# Patient Record
Sex: FEMALE | Race: WHITE
[De-identification: names, ages, dates, MRNs, and addresses within clinical notes are randomized per-mention and may not be internally consistent; named-entity substitution may affect disease eponyms.]

---

## 2018-08-21 ENCOUNTER — HOSPITAL ENCOUNTER (OUTPATIENT)
Dept: HOSPITAL 53 - M SMT | Age: 28
End: 2018-08-21
Attending: ADVANCED PRACTICE MIDWIFE
Payer: COMMERCIAL

## 2018-08-21 DIAGNOSIS — Z3A.10: Primary | ICD-10-CM

## 2018-08-21 DIAGNOSIS — Z34.81: ICD-10-CM

## 2018-08-21 LAB
BASO #: 0 10^3/UL (ref 0–0.2)
BASO %: 0.3 % (ref 0–1)
CHLAMYDIA DNA AMPLIFICATION: NEGATIVE
EOS #: 0.1 10^3/UL (ref 0–0.5)
EOSINOPHIL NFR BLD AUTO: 1.4 % (ref 0–3)
GC DNA AMPLIFICATION: NEGATIVE
HEMATOCRIT: 36.1 % (ref 36–47)
HEMOGLOBIN: 12.3 G/DL (ref 12–15.5)
IMMATURE GRANULOCYTE %: 0.3 % (ref 0–3)
LYMPH #: 1.8 10^3/UL (ref 1.5–6.5)
LYMPH %: 24.8 % (ref 24–44)
MEAN CORPUSCULAR HEMOGLOBIN: 31.8 PG (ref 27–33)
MEAN CORPUSCULAR HGB CONC: 34.1 G/DL (ref 32–36.5)
MEAN CORPUSCULAR VOLUME: 93.3 FL (ref 80–96)
MONO #: 0.7 10^3/UL (ref 0–0.8)
MONO %: 9.1 % (ref 0–5)
NEUTROPHILS #: 4.6 10^3/UL (ref 1.8–7.7)
NEUTROPHILS %: 64.1 % (ref 36–66)
NRBC BLD AUTO-RTO: 0 % (ref 0–0)
PLATELET COUNT, AUTOMATED: 229 10^3/UL (ref 150–450)
RED BLOOD COUNT: 3.87 10^6/UL (ref 4–5.4)
RED CELL DISTRIBUTION WIDTH: 11.9 % (ref 11.5–14.5)
WHITE BLOOD COUNT: 7.1 10^3/UL (ref 4–10)

## 2018-08-22 LAB
HBSAG PRENATAL: NEGATIVE
HCV AB SER QL: 0.1 INDEX (ref ?–0.8)
HIV 1&2 SCREEN CENTAUR: NEGATIVE
RUBELLA IGG QUALITATIVE: (no result)
SYPHILIS: NONREACTIVE

## 2018-09-18 ENCOUNTER — HOSPITAL ENCOUNTER (OUTPATIENT)
Dept: HOSPITAL 53 - M SMT | Age: 28
End: 2018-09-18
Attending: ADVANCED PRACTICE MIDWIFE
Payer: COMMERCIAL

## 2018-09-18 DIAGNOSIS — Z36.89: Primary | ICD-10-CM

## 2018-09-18 DIAGNOSIS — Z3A.00: ICD-10-CM

## 2018-09-18 PROCEDURE — 36415 COLL VENOUS BLD VENIPUNCTURE: CPT

## 2018-10-17 ENCOUNTER — HOSPITAL ENCOUNTER (OUTPATIENT)
Dept: HOSPITAL 53 - M SMT | Age: 28
End: 2018-10-17
Attending: ADVANCED PRACTICE MIDWIFE
Payer: COMMERCIAL

## 2018-10-17 DIAGNOSIS — Z3A.18: ICD-10-CM

## 2018-10-17 DIAGNOSIS — Z36.89: Primary | ICD-10-CM

## 2018-10-17 PROCEDURE — 76811 OB US DETAILED SNGL FETUS: CPT

## 2018-11-14 ENCOUNTER — HOSPITAL ENCOUNTER (OUTPATIENT)
Dept: HOSPITAL 53 - M SMT | Age: 28
End: 2018-11-14
Attending: ADVANCED PRACTICE MIDWIFE
Payer: COMMERCIAL

## 2018-11-14 DIAGNOSIS — Z36.89: ICD-10-CM

## 2018-11-14 DIAGNOSIS — Z34.02: Primary | ICD-10-CM

## 2018-11-14 DIAGNOSIS — Z3A.21: ICD-10-CM

## 2018-11-14 PROCEDURE — 76816 OB US FOLLOW-UP PER FETUS: CPT

## 2018-12-12 ENCOUNTER — HOSPITAL ENCOUNTER (OUTPATIENT)
Dept: HOSPITAL 53 - M SMT | Age: 28
End: 2018-12-12
Payer: COMMERCIAL

## 2018-12-12 DIAGNOSIS — Z36.89: Primary | ICD-10-CM

## 2018-12-12 LAB
BASOPHILS # BLD AUTO: 0 10^3/UL (ref 0–0.2)
BASOPHILS NFR BLD AUTO: 0.2 % (ref 0–1)
EOSINOPHIL # BLD AUTO: 0.1 10^3/UL (ref 0–0.5)
EOSINOPHIL NFR BLD AUTO: 0.6 % (ref 0–3)
HCT VFR BLD AUTO: 37.6 % (ref 36–47)
HGB BLD-MCNC: 12.5 G/DL (ref 12–15.5)
LYMPHOCYTES # BLD AUTO: 2 10^3/UL (ref 1.5–4.5)
LYMPHOCYTES NFR BLD AUTO: 20.4 % (ref 24–44)
MCH RBC QN AUTO: 31.4 PG (ref 27–33)
MCHC RBC AUTO-ENTMCNC: 33.2 G/DL (ref 32–36.5)
MCV RBC AUTO: 94.5 FL (ref 80–96)
MONOCYTES # BLD AUTO: 0.9 10^3/UL (ref 0–0.8)
MONOCYTES NFR BLD AUTO: 8.9 % (ref 0–5)
NEUTROPHILS # BLD AUTO: 6.8 10^3/UL (ref 1.8–7.7)
NEUTROPHILS NFR BLD AUTO: 69.3 % (ref 36–66)
PLATELET # BLD AUTO: 228 10^3/UL (ref 150–450)
RBC # BLD AUTO: 3.98 10^6/UL (ref 4–5.4)
WBC # BLD AUTO: 9.8 10^3/UL (ref 4–10)

## 2018-12-12 PROCEDURE — 86850 RBC ANTIBODY SCREEN: CPT

## 2018-12-12 PROCEDURE — 82950 GLUCOSE TEST: CPT

## 2018-12-12 PROCEDURE — 85025 COMPLETE CBC W/AUTO DIFF WBC: CPT

## 2018-12-12 PROCEDURE — 86900 BLOOD TYPING SEROLOGIC ABO: CPT

## 2018-12-12 PROCEDURE — 86901 BLOOD TYPING SEROLOGIC RH(D): CPT

## 2018-12-12 PROCEDURE — 36415 COLL VENOUS BLD VENIPUNCTURE: CPT

## 2019-01-16 ENCOUNTER — HOSPITAL ENCOUNTER (OUTPATIENT)
Dept: HOSPITAL 53 - M LAB REF | Age: 29
End: 2019-01-16
Attending: ADVANCED PRACTICE MIDWIFE
Payer: COMMERCIAL

## 2019-01-16 DIAGNOSIS — Z36.89: ICD-10-CM

## 2019-01-16 DIAGNOSIS — Z34.83: Primary | ICD-10-CM

## 2019-02-20 ENCOUNTER — HOSPITAL ENCOUNTER (OUTPATIENT)
Dept: HOSPITAL 53 - M LAB REF | Age: 29
End: 2019-02-20
Attending: OBSTETRICS & GYNECOLOGY
Payer: COMMERCIAL

## 2019-02-20 DIAGNOSIS — Z3A.00: ICD-10-CM

## 2019-02-20 DIAGNOSIS — Z34.83: Primary | ICD-10-CM

## 2019-03-08 ENCOUNTER — HOSPITAL ENCOUNTER (OUTPATIENT)
Dept: HOSPITAL 53 - M LAB | Age: 29
End: 2019-03-08
Attending: ADVANCED PRACTICE MIDWIFE
Payer: COMMERCIAL

## 2019-03-08 DIAGNOSIS — O26.893: Primary | ICD-10-CM

## 2019-03-08 LAB
ALBUMIN SERPL BCG-MCNC: 2.6 GM/DL (ref 3.2–5.2)
ALT SERPL W P-5'-P-CCNC: 58 U/L (ref 12–78)
BILIRUB CONJ SERPL-MCNC: 0.2 MG/DL (ref 0–0.2)
BILIRUB SERPL-MCNC: 0.3 MG/DL (ref 0.2–1)
HCT VFR BLD AUTO: 36.4 % (ref 36–47)
HGB BLD-MCNC: 12.5 G/DL (ref 12–15.5)
MCH RBC QN AUTO: 31.6 PG (ref 27–33)
MCHC RBC AUTO-ENTMCNC: 34.3 G/DL (ref 32–36.5)
MCV RBC AUTO: 91.9 FL (ref 80–96)
PLATELET # BLD AUTO: 221 10^3/UL (ref 150–450)
PROT SERPL-MCNC: 5.8 GM/DL (ref 6.4–8.2)
RBC # BLD AUTO: 3.96 10^6/UL (ref 4–5.4)
WBC # BLD AUTO: 8.9 10^3/UL (ref 4–10)

## 2019-03-11 ENCOUNTER — HOSPITAL ENCOUNTER (INPATIENT)
Dept: HOSPITAL 53 - M LDI | Age: 29
LOS: 3 days | Discharge: HOME | End: 2019-03-14
Attending: ADVANCED PRACTICE MIDWIFE | Admitting: ADVANCED PRACTICE MIDWIFE
Payer: COMMERCIAL

## 2019-03-11 VITALS — DIASTOLIC BLOOD PRESSURE: 84 MMHG | SYSTOLIC BLOOD PRESSURE: 120 MMHG

## 2019-03-11 VITALS — SYSTOLIC BLOOD PRESSURE: 114 MMHG | DIASTOLIC BLOOD PRESSURE: 72 MMHG

## 2019-03-11 VITALS — DIASTOLIC BLOOD PRESSURE: 87 MMHG | SYSTOLIC BLOOD PRESSURE: 130 MMHG

## 2019-03-11 VITALS — DIASTOLIC BLOOD PRESSURE: 79 MMHG | SYSTOLIC BLOOD PRESSURE: 119 MMHG

## 2019-03-11 VITALS — HEIGHT: 65 IN | BODY MASS INDEX: 24.98 KG/M2 | WEIGHT: 149.91 LBS

## 2019-03-11 VITALS — DIASTOLIC BLOOD PRESSURE: 82 MMHG | SYSTOLIC BLOOD PRESSURE: 134 MMHG

## 2019-03-11 VITALS — SYSTOLIC BLOOD PRESSURE: 136 MMHG | DIASTOLIC BLOOD PRESSURE: 91 MMHG

## 2019-03-11 VITALS — DIASTOLIC BLOOD PRESSURE: 85 MMHG | SYSTOLIC BLOOD PRESSURE: 132 MMHG

## 2019-03-11 VITALS — SYSTOLIC BLOOD PRESSURE: 119 MMHG | DIASTOLIC BLOOD PRESSURE: 83 MMHG

## 2019-03-11 VITALS — DIASTOLIC BLOOD PRESSURE: 80 MMHG | SYSTOLIC BLOOD PRESSURE: 122 MMHG

## 2019-03-11 VITALS — DIASTOLIC BLOOD PRESSURE: 86 MMHG | SYSTOLIC BLOOD PRESSURE: 131 MMHG

## 2019-03-11 VITALS — SYSTOLIC BLOOD PRESSURE: 131 MMHG | DIASTOLIC BLOOD PRESSURE: 82 MMHG

## 2019-03-11 VITALS — DIASTOLIC BLOOD PRESSURE: 73 MMHG | SYSTOLIC BLOOD PRESSURE: 115 MMHG

## 2019-03-11 VITALS — DIASTOLIC BLOOD PRESSURE: 89 MMHG | SYSTOLIC BLOOD PRESSURE: 128 MMHG

## 2019-03-11 VITALS — DIASTOLIC BLOOD PRESSURE: 80 MMHG | SYSTOLIC BLOOD PRESSURE: 125 MMHG

## 2019-03-11 VITALS — DIASTOLIC BLOOD PRESSURE: 82 MMHG | SYSTOLIC BLOOD PRESSURE: 123 MMHG

## 2019-03-11 VITALS — DIASTOLIC BLOOD PRESSURE: 76 MMHG | SYSTOLIC BLOOD PRESSURE: 119 MMHG

## 2019-03-11 VITALS — SYSTOLIC BLOOD PRESSURE: 113 MMHG | DIASTOLIC BLOOD PRESSURE: 68 MMHG

## 2019-03-11 VITALS — SYSTOLIC BLOOD PRESSURE: 124 MMHG | DIASTOLIC BLOOD PRESSURE: 86 MMHG

## 2019-03-11 VITALS — DIASTOLIC BLOOD PRESSURE: 89 MMHG | SYSTOLIC BLOOD PRESSURE: 133 MMHG

## 2019-03-11 VITALS — DIASTOLIC BLOOD PRESSURE: 57 MMHG | SYSTOLIC BLOOD PRESSURE: 98 MMHG

## 2019-03-11 VITALS — SYSTOLIC BLOOD PRESSURE: 128 MMHG | DIASTOLIC BLOOD PRESSURE: 84 MMHG

## 2019-03-11 VITALS — SYSTOLIC BLOOD PRESSURE: 125 MMHG | DIASTOLIC BLOOD PRESSURE: 90 MMHG

## 2019-03-11 VITALS — DIASTOLIC BLOOD PRESSURE: 83 MMHG | SYSTOLIC BLOOD PRESSURE: 129 MMHG

## 2019-03-11 DIAGNOSIS — K83.1: ICD-10-CM

## 2019-03-11 DIAGNOSIS — Z3A.39: ICD-10-CM

## 2019-03-11 LAB
HCT VFR BLD AUTO: 40.1 % (ref 36–47)
HGB BLD-MCNC: 14.2 G/DL (ref 12–15.5)
MCH RBC QN AUTO: 31.5 PG (ref 27–33)
MCHC RBC AUTO-ENTMCNC: 35.4 G/DL (ref 32–36.5)
MCV RBC AUTO: 88.9 FL (ref 80–96)
PLATELET # BLD AUTO: 263 10^3/UL (ref 150–450)
RBC # BLD AUTO: 4.51 10^6/UL (ref 4–5.4)
WBC # BLD AUTO: 10.1 10^3/UL (ref 4–10)

## 2019-03-11 PROCEDURE — 3E0P7GC INTRODUCTION OF OTHER THERAPEUTIC SUBSTANCE INTO FEMALE REPRODUCTIVE, VIA NATURAL OR ARTIFICIAL OPENING: ICD-10-PCS | Performed by: ADVANCED PRACTICE MIDWIFE

## 2019-03-11 RX ADMIN — Medication SCH MLS/HR: at 18:33

## 2019-03-11 NOTE — HPEPDOC
Obstetrical History & Physical


General


Date of Admission


Mar 11, 2019 at 12:02


Primary Care Physician:  DARYN VUONG CNM





History of Present Illness


Patient is a 28-year-old female who is a  at 39.3 weeks gestation with an 

CHAYO of 3/15/19 based off of her LMP and consistent with her 1st trimester 

ultrasound. Her pregnancy has been complicated by elevated liver enzymes and 

complaints of itching consistent with the diagnosis of cholestasis. She was seen

in the office today and sent over for IOL. She reports active fetal movement. 

She denies leaking of fluid, vaginal bleeding, or contractions.


Chief Complaint:  Other (cholestasis)


Age:  28


:  1


Term:  0


Pre-term:  0


Abortions:  0


Livin





Prenatal Care


Prenatal Care:  [facility]





Prenatal Dating


Final EDC:  Mar 15, 2019


LMP:  2018


EGA at Admission:  39.3





Antepartum Course


Prenatal Diagnos(e)s


elevated liver enzymes: Intrahepatic cholestasis of pregnancy


Height (inches):  65


Pre-Pregnancy weight (lbs.):  124


Admission Weight (lbs.):  154


Change in Weight (lbs.):  30





Past Medical History


Past Obstetrical History :  


   Past Obstetrical History:  Primgravida


Past Medical History


Medical History


varicella as a child


Surgical History:  Dittmer teeth





Family History


Significant Family History:  Other (sister with DVT)





Social History


Marital Status:  


Family situation:  Spouse/partner home


Psychosocial History:  No pertinent psych hx


* Smoker:  non-smoker


Alcohol:  Denies


Drugs:  denies





Abuse Violence Screening


Have you been hit/kicked/slapp:  No


Have you been sexually assault:  No





Prenatal Imunizations


Tdap status:  current


Influenza Status:  current





Allergies


Coded Allergies:  


     No Known Allergies (Unverified , 3/11/19)





Medications


Scheduled


Multivitamins/Prenatal (Prenatal 27-0.8 mg) 1 Tab Tab, 1 TAB PO DAILY





Physical Examination


Physical Examination


GENERAL: Alert and oriented times three.


BREAST: .


ABDOMEN: Gravid and non-tender to touch.


FETUS: Is vertex (VTX) by sterile vaginal examination (SVE), fetus is vertex 

(VTX) by Leopold.


HEART RATE: Regular rate and rhythm.


LUNGS: Clear to auscultation (CTA).


EXTREMITIES: No edema. No clonus. Deep tendon reflexes (DTRs) + 2.





Laboratory Data


[facility]


Laboratory Tests 2


3/11/19 12:25: Serology Scanned Report Hepatitis B Testing





Pertinent Laboratoy Data


RBC Antibody Screen:  Negative


HIV:  Negative


Hepatitis B:  Negative


Rapid Plasma Reagin:  Nonreactive


Rubella:  Immune


Chlamydia/Gonorrhea:  Negative


Group B Streptococcus:  Negative





Vaginal Examination


Dilation:  1cm


Effacement:  50%


Station:  -3


Cervical Consistency:  Soft


Cervical Position:  Anterior


Fetal Presentation:  Cephalic presentation


Fetal Position:  Vertex (occiput)





Fetal Assessment


Fetal Heart Rate (FHR):  120


Variability:  Moderate


Accelerations:  Positive


Decelerations:  None





Tocometer


Contractions:  Yes


Multi-drug resistant Organism:  No history of MDRO





Assessment/Plan


Assessment


IUP at 39.3 weeks gestation


Category I FHR tracing


intrahepatic cholestasis of pregnancy


GBS negative.





Plan


Admit to L&D. Dr. Bean sent over from office for IOL today.


Counseled on IOL. 


OOB ad madison.


Diet: regular.


Group B Streptococcus negative.


Labs and intravenous (IV) per unit protocol.


Counseled on cytotec and IV Pitocin for induction of labor.


Anesthesia consult per patient's request.


Lactated Ringers (LR): Bolus 800 cc prior to epidural and as needed. 


Anticipate cervical ripening.


C-S as appropriate.











DARYN VUONG CNM            Mar 11, 2019 12:44

## 2019-03-11 NOTE — IPNPDOC
Obstetrical Progress Note


Date of Service


Mar 11, 2019





Subjective


Patient reports she is comfortable.





Objective





Vital Signs








  Date Time  Temp Pulse Resp B/P (MAP) Pulse Ox O2 Delivery O2 Flow Rate FiO2


 


3/11/19 18:08 98.1 66 18 119/76 (90)    











Fetal Assessment


Fetal Heart Rate (FHR):  120


Variability:  Moderate


Accelerations:  Positive


Decelerations:  None


Fetal Heart Rate Tracing:  Category I





Tocometer


Contractions:  Yes


Frequency:  irregular





Sterile Vaginal Examination


Dilation:  1cm


Effacement (%):  other (75%)


Station:  -2


Cervical Consistency:  Soft


Cervical Position:  Anterior


Fetal Postion/Presentation:  Cephalic presentation





Assessment and Plan


Age:  28


:  1


Term:  0


Pre-term:  0


Abortions:  0


Livin


EGA at Admission:  39.3


Fetal Status:  Reassuring


Group B Streptococcus:  Negative


Anticipate:  Vaginal Delivery


Additional Comments


Cooks Woodard bulb inserted using 60/40. Patient tolerated well. Pitocin to be 

started per order.











DARYN VUONG CNM            Mar 11, 2019 18:24

## 2019-03-12 VITALS — SYSTOLIC BLOOD PRESSURE: 120 MMHG | DIASTOLIC BLOOD PRESSURE: 74 MMHG

## 2019-03-12 VITALS — SYSTOLIC BLOOD PRESSURE: 110 MMHG | DIASTOLIC BLOOD PRESSURE: 76 MMHG

## 2019-03-12 PROCEDURE — 0HQ9XZZ REPAIR PERINEUM SKIN, EXTERNAL APPROACH: ICD-10-PCS | Performed by: ADVANCED PRACTICE MIDWIFE

## 2019-03-12 RX ADMIN — Medication SCH MLS/HR: at 04:19

## 2019-03-12 RX ADMIN — ACETAMINOPHEN PRN MG: 500 TABLET ORAL at 06:38

## 2019-03-12 RX ADMIN — DIBUCAINE PRN DOSE: 1 OINTMENT TOPICAL at 21:59

## 2019-03-12 RX ADMIN — Medication SCH TAB: at 09:00

## 2019-03-12 RX ADMIN — ACETAMINOPHEN PRN MG: 500 TABLET ORAL at 22:00

## 2019-03-12 RX ADMIN — ACETAMINOPHEN PRN MG: 500 TABLET ORAL at 15:48

## 2019-03-12 NOTE — DNPDOC
[facility] Delivery Note


Delivery Note


DATE OF DELIVERY: 19 at 0343 





PREDELIVERY DIAGNOSIS: 39-4/7 weeks' gestation and labor. 





POST DELIVERY DIAGNOSIS: Delivered.





PROCEDURE: Spontaneous vaginal delivery.





OBSTETRICIAN: Daryn Fam CNM, TERE





ANESTHESIA: epidural.





ESTIMATED BLOOD LOSS: 350 mL.





FINDINGS: 6 pounds 11 ounces; 3020 grams; male infant, Apgar Score 9/9, 

intrahepatic cholestasis of pregnancy.





DELIVERY SUMMARY: Patient is a 28-year-old female who is now a  who 

presented to L&D for an induction of labor for ICP. She received 1 dose of 

Cytotec, a israel bulb, and IV Pitocin for induction. She progressed to fully 

dilated at 0320 and pushed to a living male in the EZRA position with restitution

to ROT at 0343. The anterior shoulder delivered with ease and the corpus 

immediately followed. The baby was placed on the maternal abdomen active and 

crying skin-to-skin. The cord was clamped x2 after pulsation sent and cut by the

FOB. A 3-vessel cord was noted. The placenta delivered spontaneously and intact 

at 0356. Uterine hemostasis was achieved via rapid infusion of IV Pitocin and 

fundal massage. The vagina and perineum were inspect and found to have a right 

labial laceration into a right wall laceration that was repaired with a 3.0 

Vicryl Rapide CT-1. Mom plans to breastfeed her  and they are naming him 

Wu. Both mom and baby are in stable condition.











DARYN FAM CNM            Mar 12, 2019 05:44

## 2019-03-13 VITALS — SYSTOLIC BLOOD PRESSURE: 133 MMHG | DIASTOLIC BLOOD PRESSURE: 80 MMHG

## 2019-03-13 VITALS — SYSTOLIC BLOOD PRESSURE: 119 MMHG | DIASTOLIC BLOOD PRESSURE: 95 MMHG

## 2019-03-13 RX ADMIN — Medication SCH TAB: at 08:20

## 2019-03-13 RX ADMIN — IBUPROFEN PRN MG: 800 TABLET, FILM COATED ORAL at 08:21

## 2019-03-13 RX ADMIN — ACETAMINOPHEN PRN MG: 500 TABLET ORAL at 15:43

## 2019-03-14 VITALS — SYSTOLIC BLOOD PRESSURE: 122 MMHG | DIASTOLIC BLOOD PRESSURE: 81 MMHG

## 2019-03-14 RX ADMIN — DIBUCAINE PRN DOSE: 1 OINTMENT TOPICAL at 10:21

## 2019-03-14 RX ADMIN — ACETAMINOPHEN PRN MG: 500 TABLET ORAL at 09:32

## 2019-03-14 RX ADMIN — IBUPROFEN PRN MG: 800 TABLET, FILM COATED ORAL at 05:26

## 2019-03-14 RX ADMIN — Medication SCH TAB: at 07:40
